# Patient Record
Sex: FEMALE | Race: WHITE | Employment: STUDENT | URBAN - METROPOLITAN AREA
[De-identification: names, ages, dates, MRNs, and addresses within clinical notes are randomized per-mention and may not be internally consistent; named-entity substitution may affect disease eponyms.]

---

## 2017-01-11 ENCOUNTER — OFFICE VISIT (OUTPATIENT)
Dept: FAMILY MEDICINE | Facility: CLINIC | Age: 22
End: 2017-01-11
Payer: COMMERCIAL

## 2017-01-11 VITALS
HEIGHT: 65 IN | OXYGEN SATURATION: 97 % | BODY MASS INDEX: 23.99 KG/M2 | DIASTOLIC BLOOD PRESSURE: 62 MMHG | SYSTOLIC BLOOD PRESSURE: 100 MMHG | RESPIRATION RATE: 14 BRPM | TEMPERATURE: 98.2 F | HEART RATE: 82 BPM | WEIGHT: 144 LBS

## 2017-01-11 DIAGNOSIS — R09.82 POST-NASAL DRAINAGE: Primary | ICD-10-CM

## 2017-01-11 DIAGNOSIS — E78.00 ELEVATED LDL CHOLESTEROL LEVEL: ICD-10-CM

## 2017-01-11 PROCEDURE — 99214 OFFICE O/P EST MOD 30 MIN: CPT | Performed by: INTERNAL MEDICINE

## 2017-01-11 PROCEDURE — 80053 COMPREHEN METABOLIC PANEL: CPT | Performed by: INTERNAL MEDICINE

## 2017-01-11 PROCEDURE — 36415 COLL VENOUS BLD VENIPUNCTURE: CPT | Performed by: INTERNAL MEDICINE

## 2017-01-11 PROCEDURE — 80061 LIPID PANEL: CPT | Performed by: INTERNAL MEDICINE

## 2017-01-11 RX ORDER — FLUTICASONE PROPIONATE 50 MCG
1-2 SPRAY, SUSPENSION (ML) NASAL DAILY
Qty: 1 BOTTLE | Refills: 1 | Status: SHIPPED | OUTPATIENT
Start: 2017-01-11 | End: 2018-10-09

## 2017-01-11 NOTE — MR AVS SNAPSHOT
After Visit Summary   1/11/2017    Charmaine Lopez    MRN: [id number]           Patient Information     Date Of Birth          1995        Visit Information        Provider Department      1/11/2017 10:00 AM Kailyn Flood MD [facility]        Today's Diagnoses     Post-nasal drainage    -  1     Elevated LDL cholesterol level           Care Instructions    Start using Flonase nasal spray: 1-2 sprays into each nostril per day for the next 4 weeks. Even if your symptoms appear to improve in a week or so, continue using the spray the entire 4 weeks to completely clear your symptoms. If your symptoms do not significantly improve in that time, please follow-up with the clinic.        Follow-ups after your visit        Who to contact     If you have questions or need follow up information about today's clinic visit or your schedule please contact [facility] directly at [phone].  Normal or non-critical lab and imaging results will be communicated to you by [facility] [employer], letter or phone within 4 business days after the clinic has received the results. If you do not hear from us within 7 days, please contact the clinic through [facility] or phone. If you have a critical or abnormal lab result, we will notify you by phone as soon as possible.  Submit refill requests through [employer] or call your pharmacy and they will forward the refill request to us. Please allow 3 business days for your refill to be completed.          Additional Information About Your Visit        MyChart Information     [employer] gives you secure access to your electronic health record. If you see a primary care provider, you can also send messages to your care team and make appointments. If you have questions, please call your primary care clinic.  If you do not have a primary care provider, please call [phone] and they will assist you.        Care EveryWhere ID     This is your Care EveryWhere ID.  "This could be used by other organizations to access your [place] medical records  [id number]        Your Vitals Were     Pulse Temperature Respirations    82 98.2  F (36.8  C) (Oral) 14    Height BMI (Body Mass Index) Pulse Oximetry    5' 5\" (1.651 m) 23.96 kg/m2 97%    Last Period          12/19/2016 (Approximate)         Blood Pressure from Last 3 Encounters:   01/11/17 100/62   12/29/16 123/69   07/14/16 108/60    Weight from Last 3 Encounters:   01/11/17 144 lb (65.318 kg)   12/28/16 140 lb (63.504 kg)   07/14/16 140 lb 4.8 oz (63.64 kg)              We Performed the Following     Comprehensive metabolic panel     Lipid Profile with reflex to direct LDL          Today's Medication Changes          These changes are accurate as of: 1/11/17 10:32 AM.  If you have any questions, ask your nurse or doctor.               Start taking these medicines.        Dose/Directions    fluticasone 50 MCG/ACT spray   Commonly known as:  FLONASE   Used for:  Post-nasal drainage   Started by:  Kailyn Flood MD        Dose:  1-2 spray   Spray 1-2 sprays into both nostrils daily   Quantity:  1 Bottle   Refills:  1            Where to get your medicines      These medications were sent to [facility] [address] [place] [facility] [place], [place] - [address]  [address] [facility] [address]     Phone:  [phone]    - fluticasone 50 MCG/ACT spray             Primary Care Provider Office Phone # Fax #    Kailyn Flood [phone]       [facility] [address] [facility] [address]        Thank you!     Thank you for choosing [facility]  for your care. Our goal is always to provide you with excellent care. Hearing back from our patients is one way we can continue to improve our services. Please take a few minutes to complete the written survey that you may receive in the mail after your visit with us. Thank you!             Your Updated Medication List - Protect " others around you: Learn how to safely use, store and throw away your medicines at www.disposemymeds.org.          This list is accurate as of: 1/11/17 10:32 AM.  Always use your most recent med list.                   Brand Name Dispense Instructions for use    fluticasone 50 MCG/ACT spray    FLONASE    1 Bottle    Spray 1-2 sprays into both nostrils daily       norethindrone-ethinyl estradiol 1-20 MG-MCG per tablet    GILDESS MAURICIO 1/20    84 tablet    Take 1 tablet by mouth daily       sulfacetamide sodium (Acne) 10 % Lotn      Apply topically daily

## 2017-01-11 NOTE — NURSING NOTE
"Chief Complaint   Patient presents with     URI     Lipids     states cholesterol has been elevated in the past and would like to have it checked.        Initial /62 mmHg  Pulse 82  Temp(Src) 98.2  F (36.8  C) (Oral)  Resp 14  Ht 5' 5\" (1.651 m)  Wt 144 lb (65.318 kg)  BMI 23.96 kg/m2  SpO2 97%  LMP 12/19/2016 (Approximate) Estimated body mass index is 23.96 kg/(m^2) as calculated from the following:    Height as of this encounter: 5' 5\" (1.651 m).    Weight as of this encounter: 144 lb (65.318 kg).  BP completed using cuff size: Large    "

## 2017-01-11 NOTE — PROGRESS NOTES
SUBJECTIVE:                                                    Charmaine Lopez is a 21 year old female who presents to clinic today for the following health issues:      Acute Illness   Acute illness concerns: cough  (seen about 4 weeks ago and diagnosed with a virus)    Onset: about 4 weeks       Fever: YES- when she first developed symptoms     Chills/Sweats: YES- a month ago    Headache (location?): no    Sinus Pressure:no    Conjunctivitis:  no    Ear Pain: no    Rhinorrhea: no    Congestion: no    Sore Throat: YES     Cough: YES-productive of green sputum    Wheeze: no    Decreased Appetite: no    Nausea: no    Vomiting: no    Diarrhea:  no    Dysuria/Freq.: no    Fatigue/Achiness: no    Sick/Strep Exposure: no    Therapies Tried and outcome: ibuprofen      Sore Throat:  About a month ago, the patient began experiencing symptoms of a strep throat including a sore throat, voice hoarseness, fever and chills, and a cough. Her symptoms were at their worst initially, but progressively improved with rest and the use of Ibuprofen as needed for discomfort. Although her symptoms have improved, they have not completely subsided, which is why she would like to be evaluated today. The patient denies allergies or exposure to illness, but does complain of post-nasal drainage, a sore throat, and a cough, all of which have persisted since her symptoms began. Of note, she was seen during the onset of her symptoms, but was told her symptoms were the result of a viral infection, which would need to be cleared with time.    Cholesterol:  The patient had a lipid panel completed in 2014 which resulted abnormally, with a total cholesterol reading of 239 and a LDL cholesterol of 150. With her history of elevated cholesterol, she would like additional testing today to recheck her levels. She states her levels should not be that high because she eats healthy and exercises regularly.        Problem list and histories reviewed & adjusted,  "as indicated.  Additional history: as documented    Labs reviewed in EP[facility]  Problem list, Medication list, Allergies, and Medical/Social/Surgical histories reviewed in [facility] and updated as appropriate.      REVIEW OF SYSTEMS:  C: NEGATIVE for fever, chills, or change in weight  E: NEGATIVE for vision changes or irritation  E/M: POSITIVE for a sore throat; NEGATIVE for ear or mouth problems  R: POSITIVE for a productive cough; NEGATIVE for significant cough or SOB  CV: NEGATIVE for chest pain, palpitations or peripheral edema  GI: NEGATIVE for nausea, abdominal pain, heartburn, or change in bowel habits  M: NEGATIVE for significant arthralgias or myalgia  N: NEGATIVE for weakness, dizziness or paresthesias  E: Hx or elevated total and LDL cholesterol; Fasting for labs today; NEGATIVE for temperature intolerance, or skin/hair changes  P: NEGATIVE for changes in mood or affect    This document serves as a record of the services and decisions personally performed and made by Kailyn Flood MD. It was created on her behalf by Jaclyn Smith, a trained medical scribe. The creation of this document is based the provider's statements to the medical scribe.  Jaclyn Smith, January 11, 2017 10:07 AM     OBJECTIVE:                                                    /62 mmHg  Pulse 82  Temp(Src) 98.2  F (36.8  C) (Oral)  Resp 14  Ht 1.651 m (5' 5\")  Wt 65.318 kg (144 lb)  BMI 23.96 kg/m2  SpO2 97%  LMP 12/19/2016 (Approximate)  Body mass index is 23.96 kg/(m^2).    EXAM:  GENERAL: Patient appears healthy, alert and not distressed.  EYES: Eyes appear grossly normal to inspection, with normal conjunctivae and sclerae  HENT: Nasal congestion with evidence of post-nasal drip noted; Ear canals and TM's appear normal, nose and mouth are without ulcers or lesions, oropharynx is clear and oral mucous membranes are moist  NECK: No adenopathy present, no asymmetry, masses, or scars noted, thyroid is normal to palpation  RESP: " Lungs are clear to auscultation - no rales, rhonchi or wheezes present  CV: Regular rate and rhythm, normal S1 S2 heart sounds, no ectopy, no peripheral edema present, peripheral pulses normal, no carotid bruit.  ABDOMEN: Soft, nontender, no hepatosplenomegaly, no masses, normal bowel sounds  MS: No gross musculoskeletal defects noted, no edema, gait is age appropriate without ataxia  NEURO: Patient exhibits normal strength and tone, normal speech and mentation  PSYCH: Mentation appears normal, affect is normal/bright    Diagnostic Test Results:  No results found for this or any previous visit (from the past 24 hour(s)).      ASSESSMENT/PLAN:                                                    (R09.82) Post-nasal drainage  (primary encounter diagnosis)  Comment: Symptomatic for one month; Initially caused by viral infection, but has persisted since; Start Flonase nasal spray: 1-2 sprays into each nostril daily for four weeks for symptom clearance; If symptoms do not significantly improve in that time, follow-up with the clinic.  Plan: fluticasone (FLONASE) 50 MCG/ACT spray          (E78.00) Elevated LDL cholesterol level  Comment: Historically elevated Total and LDL cholesterol readings; 7/30/2014 lipid panel revealed Total Chol of 239 and LDL Chol of 150; Requested fasting labs to recheck cholesterol levels; Patient is fasting today and will have labs drawn today; she is in college; diet and exercise reviewed; do not anticiapte prescription meds. Pooled cohort risk assessment is not validated in this age group  Reviewed risk assessment  Plan: Lipid Profile with reflex to direct LDL,         Comprehensive metabolic panel            The information in this document, created by a medical scribe for me, accurately reflects the services I personally performed and the decisions made by me. I have reviewed and approved this document for accuracy.  Dr. Kailyn Flood, 10:32 AM, January 11, 2017    Kailyn Flood,  MD  Internal Medicine   [facility]

## 2017-01-11 NOTE — PATIENT INSTRUCTIONS
Start using Flonase nasal spray: 1-2 sprays into each nostril per day for the next 4 weeks. Even if your symptoms appear to improve in a week or so, continue using the spray the entire 4 weeks to completely clear your symptoms. If your symptoms do not significantly improve in that time, please follow-up with the clinic.

## 2017-01-12 LAB
ALBUMIN SERPL-MCNC: 3.6 G/DL (ref 3.4–5)
ALP SERPL-CCNC: 52 U/L (ref 40–150)
ALT SERPL W P-5'-P-CCNC: 18 U/L (ref 0–50)
ANION GAP SERPL CALCULATED.3IONS-SCNC: 6 MMOL/L (ref 3–14)
AST SERPL W P-5'-P-CCNC: 12 U/L (ref 0–45)
BILIRUB SERPL-MCNC: 0.2 MG/DL (ref 0.2–1.3)
BUN SERPL-MCNC: 7 MG/DL (ref 7–30)
CALCIUM SERPL-MCNC: 9 MG/DL (ref 8.5–10.1)
CHLORIDE SERPL-SCNC: 108 MMOL/L (ref 94–109)
CHOLEST SERPL-MCNC: 226 MG/DL
CO2 SERPL-SCNC: 25 MMOL/L (ref 20–32)
CREAT SERPL-MCNC: 0.74 MG/DL (ref 0.52–1.04)
GFR SERPL CREATININE-BSD FRML MDRD: NORMAL ML/MIN/1.7M2
GLUCOSE SERPL-MCNC: 81 MG/DL (ref 70–99)
HDLC SERPL-MCNC: 56 MG/DL
LDLC SERPL CALC-MCNC: 150 MG/DL
NONHDLC SERPL-MCNC: 170 MG/DL
POTASSIUM SERPL-SCNC: 4.2 MMOL/L (ref 3.4–5.3)
PROT SERPL-MCNC: 7.5 G/DL (ref 6.8–8.8)
SODIUM SERPL-SCNC: 139 MMOL/L (ref 133–144)
TRIGL SERPL-MCNC: 98 MG/DL

## 2017-01-13 ENCOUNTER — MYC MEDICAL ADVICE (OUTPATIENT)
Dept: FAMILY MEDICINE | Facility: CLINIC | Age: 22
End: 2017-01-13

## 2017-01-16 NOTE — PROGRESS NOTES
Quick Note:    LDL still running A bit on the high side; maximize diet and exercise; no lipid medications are recommended at this time.  Pt advised via My Chart.    ______

## 2017-03-22 ENCOUNTER — OFFICE VISIT (OUTPATIENT)
Dept: FAMILY MEDICINE | Facility: CLINIC | Age: 22
End: 2017-03-22
Payer: COMMERCIAL

## 2017-03-22 VITALS
TEMPERATURE: 97.6 F | BODY MASS INDEX: 24.66 KG/M2 | RESPIRATION RATE: 18 BRPM | WEIGHT: 148 LBS | DIASTOLIC BLOOD PRESSURE: 56 MMHG | HEART RATE: 99 BPM | OXYGEN SATURATION: 100 % | SYSTOLIC BLOOD PRESSURE: 118 MMHG | HEIGHT: 65 IN

## 2017-03-22 DIAGNOSIS — Z23 NEED FOR RABIES VACCINATION: Primary | ICD-10-CM

## 2017-03-22 PROCEDURE — 96372 THER/PROPH/DIAG INJ SC/IM: CPT | Performed by: NURSE PRACTITIONER

## 2017-03-22 PROCEDURE — 99213 OFFICE O/P EST LOW 20 MIN: CPT | Mod: 25 | Performed by: NURSE PRACTITIONER

## 2017-03-22 PROCEDURE — 90675 RABIES VACCINE IM: CPT | Performed by: NURSE PRACTITIONER

## 2017-03-22 NOTE — PROGRESS NOTES
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  She has never had the rabies series before.  She has a normally functioning immune system, normal kidney and liver function.  Currently well.  Tolerated immunizations well in the past.    She is at school in [place].  Would like to start series here and finish in [place].    Problem list and histories reviewed & adjusted, as indicated.  Additional history: as documented    Patient Active Problem List   Diagnosis     No active medical problems     Oral contraceptive pill surveillance     Past Surgical History:   Procedure Laterality Date     HC TOOTH EXTRACTION W/FORCEP  2011       Social History   Substance Use Topics     Smoking status: Never Smoker     Smokeless tobacco: Never Used     Alcohol use No     Family History   Problem Relation Age of Onset     Thyroid Disease Mother      GASTROINTESTINAL DISEASE Mother      ulcerative colitis     Lipids Father      Hyperlipidemia Father      Asthma Brother            Reviewed and updated as needed this visit by clinical staff       Reviewed and updated as needed this visit by Provider         ROS:  SEE HPI.    OBJECTIVE:                                                    /56 (BP Location: Right arm, Patient Position: Chair, Cuff Size: Adult Regular)  Pulse 99  Temp 97.6  F (36.4  C) (Tympanic)  Resp 18  Ht 5' 5\" (1.651 m)  Wt 148 lb (67.1 kg)  LMP 03/21/2017 (Exact Date)  SpO2 100%  Breastfeeding? No  BMI 24.63 kg/m2  Body mass index is 24.63 kg/(m^2).  GENERAL: healthy, alert and no distress  PSYCH: mentation appears normal, affect normal/bright    Diagnostic Test Results:  none  "     ASSESSMENT/PLAN:                                                    1. Need for rabies vaccination  21 y.o. 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  Start series now.  Reviewed timing of follow up injections needed.  She feels comfortable locating a clinic in [place] that can provide the remaining 2 doses she needs.  Pt agrees with plan and verbalized understanding.  - RABIES VACCINE, IM  - INJECTION INTRAMUSCULAR OR SUB-Q    BREE Cardenas Ra, CNP  [facility]

## 2017-03-22 NOTE — PATIENT INSTRUCTIONS
Return to clinic in 1 week and then 2-3 weeks after that for the repeat injections.    The schedule is:  1 dose today.  1 dose in 1 week and then final dose on weeks 3 or 4.    In the future you will have titers done to check not immunity status.

## 2017-03-22 NOTE — NURSING NOTE
"Chief Complaint   Patient presents with     Consult     Immunizations       Initial /56 (BP Location: Right arm, Patient Position: Chair, Cuff Size: Adult Regular)  Pulse 99  Temp 97.6  F (36.4  C) (Tympanic)  Resp 18  Ht 5' 5\" (1.651 m)  Wt 148 lb (67.1 kg)  LMP 03/21/2017 (Exact Date)  SpO2 100%  Breastfeeding? No  BMI 24.63 kg/m2 Estimated body mass index is 24.63 kg/(m^2) as calculated from the following:    Height as of this encounter: 5' 5\" (1.651 m).    Weight as of this encounter: 148 lb (67.1 kg).  Medication Reconciliation: complete     Patient would like to be notified at the following phone number for results from this visit   [phone] OK to leave message or Marysol Rutherford CMA (AAMA) 3/22/2017 11:06 AM      "

## 2017-03-22 NOTE — MR AVS SNAPSHOT
After Visit Summary   3/22/2017    Charmaine Lopez    MRN: [id number]           Patient Information     Date Of Birth          1995        Visit Information        Provider Department      3/22/2017 11:00 AM Maria G Mcfarland Ra, APRN CNP [facility]        Care Instructions    Return to clinic in 1 week and then 2-3 weeks after that for the repeat injections.    The schedule is:  1 dose today.  1 dose in 1 week and then final dose on weeks 3 or 4.    In the future you will have titers done to check not immunity status.        Follow-ups after your visit        Who to contact     If you have questions or need follow up information about today's clinic visit or your schedule please contact [facility] directly at [phone].  Normal or non-critical lab and imaging results will be communicated to you by MyChart, letter or phone within 4 business days after the clinic has received the results. If you do not hear from us within 7 days, please contact the clinic through [employer] or phone. If you have a critical or abnormal lab result, we will notify you by phone as soon as possible.  Submit refill requests through [email or web address] or call your pharmacy and they will forward the refill request to us. Please allow 3 business days for your refill to be completed.          Additional Information About Your Visit        MyChart Information     [email or web address] gives you secure access to your electronic health record. If you see a primary care provider, you can also send messages to your care team and make appointments. If you have questions, please call your primary care clinic.  If you do not have a primary care provider, please call [phone] and they will assist you.        Care EveryWhere ID     This is your Care EveryWhere ID. This could be used by other organizations to access your [place] medical records  [id number]        Your Vitals Were     Pulse Temperature Respirations Height Last  "Period Pulse Oximetry    99 97.6  F (36.4  C) (Tympanic) 18 5' 5\" (1.651 m) 03/21/2017 (Exact Date) 100%    Breastfeeding? BMI (Body Mass Index)                No 24.63 kg/m2           Blood Pressure from Last 3 Encounters:   03/22/17 118/56   01/11/17 100/62   12/29/16 123/69    Weight from Last 3 Encounters:   03/22/17 148 lb (67.1 kg)   01/11/17 144 lb (65.3 kg)   12/28/16 140 lb (63.5 kg)              Today, you had the following     No orders found for display       Primary Care Provider Office Phone # Fax #    Kailyn Flood [phone]       [facility] [address] [facility] [address]        Thank you!     Thank you for choosing [facility]  for your care. Our goal is always to provide you with excellent care. Hearing back from our patients is one way we can continue to improve our services. Please take a few minutes to complete the written survey that you may receive in the mail after your visit with us. Thank you!             Your Updated Medication List - Protect others around you: Learn how to safely use, store and throw away your medicines at www.disposemymeds.org.          This list is accurate as of: 3/22/17 11:25 AM.  Always use your most recent med list.                   Brand Name Dispense Instructions for use    fluticasone 50 MCG/ACT spray    FLONASE    1 Bottle    Spray 1-2 sprays into both nostrils daily       norethindrone-ethinyl estradiol 1-20 MG-MCG per tablet    GILDESS FE 1/20    84 tablet    Take 1 tablet by mouth daily       sulfacetamide sodium (Acne) 10 % lotion      Apply topically daily         "

## 2017-04-03 ENCOUNTER — TELEPHONE (OUTPATIENT)
Dept: FAMILY MEDICINE | Facility: CLINIC | Age: 22
End: 2017-04-03

## 2017-04-03 NOTE — TELEPHONE ENCOUNTER
Patient is calling to have the order for her Rabies vaccines faxed to [place].  They need a copy of it to schedule an appointment for her next shot.  She is going to school there. Had one injection here at the clinic, will be having the  Remaining injections there. The order was printed and faxed to [phone] the  [facility] in [place].   Marjan Myles RN  Triage Nurse

## 2017-04-05 NOTE — TELEPHONE ENCOUNTER
Hospital in [place] does not like the way the order was written, so they have sent another request to have it rewritten and sent.   Per deysi, call patient and ask if she would like to just start the series over when she is back in town this summer, or if she would  Like to see a provider in [place] to have them order it for her. She would have to start the series over at this point due to the   Delay in ordering. Have sent the visit note and a written letter, these were not sufficient.   Called patient and left message to ask if she would like to start over when she is here this summer. Just needs to let us know a few days ahead  Of time so we can have the vaccines on hand, or if she wants to have a provider in [place] area order it for her. (this would be another visit)    Marjan Myles, NICK  Triage Nurse

## 2017-05-15 ENCOUNTER — TELEPHONE (OUTPATIENT)
Dept: FAMILY MEDICINE | Facility: CLINIC | Age: 22
End: 2017-05-15

## 2017-05-15 NOTE — TELEPHONE ENCOUNTER
Pt was unable to finish rabies series in [place], so will be starting it over and doing all three here.  Three doses left in fridge, pts name placed on them and she made appts 5/19 and 5/26, she will make last appt after 2nd injection as does not know her work sched that far out.

## 2017-05-19 ENCOUNTER — ALLIED HEALTH/NURSE VISIT (OUTPATIENT)
Dept: NURSING | Facility: CLINIC | Age: 22
End: 2017-05-19
Payer: COMMERCIAL

## 2017-05-19 DIAGNOSIS — Z23 NEED FOR VACCINATION: Primary | ICD-10-CM

## 2017-05-19 PROCEDURE — 99207 ZZC NO CHARGE NURSE ONLY: CPT

## 2017-05-19 PROCEDURE — 90471 IMMUNIZATION ADMIN: CPT

## 2017-05-19 PROCEDURE — 90675 RABIES VACCINE IM: CPT

## 2017-05-19 NOTE — MR AVS SNAPSHOT
After Visit Summary   5/19/2017    Charmaine Lopez    MRN: [id number]           Patient Information     Date Of Birth          1995        Visit Information        Provider Department      5/19/2017 1:30 PM  NURSE [facility]        Today's Diagnoses     Need for vaccination    -  1       Follow-ups after your visit        Your next 10 appointments already scheduled     May 26, 2017  9:30 AM CDT   Nurse Only with  NURSE   [facility]    [address] [facility] [address] [phone]              Who to contact     If you have questions or need follow up information about today's clinic visit or your schedule please contact [facility] directly at [phone].  Normal or non-critical lab and imaging results will be communicated to you by scroll kithart, letter or phone within 4 business days after the clinic has received the results. If you do not hear from us within 7 days, please contact the clinic through scroll kithart or phone. If you have a critical or abnormal lab result, we will notify you by phone as soon as possible.  Submit refill requests through [employer] or call your pharmacy and they will forward the refill request to us. Please allow 3 business days for your refill to be completed.          Additional Information About Your Visit        MyChart Information     [employer] gives you secure access to your electronic health record. If you see a primary care provider, you can also send messages to your care team and make appointments. If you have questions, please call your primary care clinic.  If you do not have a primary care provider, please call [phone] and they will assist you.        Care EveryWhere ID     This is your Care EveryWhere ID. This could be used by other organizations to access your [place] medical records  [id number]         Blood Pressure from Last 3 Encounters:   03/22/17 118/56    01/11/17 100/62   12/29/16 123/69    Weight from Last 3 Encounters:   03/22/17 148 lb (67.1 kg)   01/11/17 144 lb (65.3 kg)   12/28/16 140 lb (63.5 kg)              We Performed the Following     ADMIN 1st VACCINE     RABIES VACCINE, IM     SCREENING QUESTIONS FOR ADULT IMMUNIZATIONS        Primary Care Provider Office Phone # Fax #    Kailyn Flood [phone]       [facility] [address]  [facility] [address]        Thank you!     Thank you for choosing [facility]  for your care. Our goal is always to provide you with excellent care. Hearing back from our patients is one way we can continue to improve our services. Please take a few minutes to complete the written survey that you may receive in the mail after your visit with us. Thank you!             Your Updated Medication List - Protect others around you: Learn how to safely use, store and throw away your medicines at www.disposemymeds.org.          This list is accurate as of: 5/19/17  1:48 PM.  Always use your most recent med list.                   Brand Name Dispense Instructions for use    fluticasone 50 MCG/ACT spray    FLONASE    1 Bottle    Spray 1-2 sprays into both nostrils daily       norethindrone-ethinyl estradiol 1-20 MG-MCG per tablet    GILDESS FE 1/20    84 tablet    Take 1 tablet by mouth daily       sulfacetamide sodium (Acne) 10 % lotion      Apply topically daily

## 2017-05-25 ENCOUNTER — ALLIED HEALTH/NURSE VISIT (OUTPATIENT)
Dept: NURSING | Facility: CLINIC | Age: 22
End: 2017-05-25
Payer: COMMERCIAL

## 2017-05-25 DIAGNOSIS — Z23 NEED FOR RABIES VACCINATION: Primary | ICD-10-CM

## 2017-05-25 PROCEDURE — 90471 IMMUNIZATION ADMIN: CPT

## 2017-05-25 PROCEDURE — 99207 ZZC NO CHARGE NURSE ONLY: CPT

## 2017-05-25 PROCEDURE — 90675 RABIES VACCINE IM: CPT

## 2017-05-25 NOTE — NURSING NOTE
Screening Questionnaire for Adult Immunization    Are you sick today?   No   Do you have allergies to medications, food, a vaccine component or latex?   No   Have you ever had a serious reaction after receiving a vaccination?   No   Do you have a long-term health problem with heart disease, lung disease, asthma, kidney disease, metabolic disease (e.g. diabetes), anemia, or other blood disorder?   No   Do you have cancer, leukemia, HIV/AIDS, or any other immune system problem?   No   In the past 3 months, have you taken medications that affect  your immune system, such as prednisone, other steroids, or anticancer drugs; drugs for the treatment of rheumatoid arthritis, Crohn s disease, or psoriasis; or have you had radiation treatments?   No   Have you had a seizure, or a brain or other nervous system problem?   No   During the past year, have you received a transfusion of blood or blood     products, or been given immune (gamma) globulin or antiviral drug?   No   For women: Are you pregnant or is there a chance you could become        pregnant during the next month?   No   Have you received any vaccinations in the past 4 weeks?   No     Immunization questionnaire answers were all negative.      MNVFC doesn't apply on this patient    Per orders of Maria G Mcfarland, injection of Rabies vaccine given by Anna Regan. Patient instructed to remain in clinic for 20 minutes afterwards, and to report any adverse reaction to me immediately.       Screening performed by Anna Regan on 5/25/2017 at 9:08 AM.

## 2017-05-25 NOTE — MR AVS SNAPSHOT
After Visit Summary   5/25/2017    Charmaine Lopez    MRN: [id number]           Patient Information     Date Of Birth          1995        Visit Information        Provider Department      5/25/2017 8:30 AM  NURSE [place] Yodit Crow        Today's Diagnoses     Need for rabies vaccination    -  1       Follow-ups after your visit        Who to contact     If you have questions or need follow up information about today's clinic visit or your schedule please contact [facility] directly at [phone].  Normal or non-critical lab and imaging results will be communicated to you by Jintronixhart, letter or phone within 4 business days after the clinic has received the results. If you do not hear from us within 7 days, please contact the clinic through Jintronixhart or phone. If you have a critical or abnormal lab result, we will notify you by phone as soon as possible.  Submit refill requests through Lumus or call your pharmacy and they will forward the refill request to us. Please allow 3 business days for your refill to be completed.          Additional Information About Your Visit        MyChart Information     Lumus gives you secure access to your electronic health record. If you see a primary care provider, you can also send messages to your care team and make appointments. If you have questions, please call your primary care clinic.  If you do not have a primary care provider, please call [phone] and they will assist you.        Care EveryWhere ID     This is your Care EveryWhere ID. This could be used by other organizations to access your [place] medical records  [id number]         Blood Pressure from Last 3 Encounters:   03/22/17 118/56   01/11/17 100/62   12/29/16 123/69    Weight from Last 3 Encounters:   03/22/17 148 lb (67.1 kg)   01/11/17 144 lb (65.3 kg)   12/28/16 140 lb (63.5 kg)              We Performed the Following     RABIES VACCINE, IM        Primary Care  Provider Office Phone # Fax #    Kailyn Flood [phone]       [facility] [address]  Counts include 234 beds at the [facility] [address]        Thank you!     Thank you for choosing [facility]  for your care. Our goal is always to provide you with excellent care. Hearing back from our patients is one way we can continue to improve our services. Please take a few minutes to complete the written survey that you may receive in the mail after your visit with us. Thank you!             Your Updated Medication List - Protect others around you: Learn how to safely use, store and throw away your medicines at www.disposemymeds.org.          This list is accurate as of: 5/25/17  9:13 AM.  Always use your most recent med list.                   Brand Name Dispense Instructions for use    fluticasone 50 MCG/ACT spray    FLONASE    1 Bottle    Spray 1-2 sprays into both nostrils daily       norethindrone-ethinyl estradiol 1-20 MG-MCG per tablet    GILDESS FE 1/20    84 tablet    Take 1 tablet by mouth daily       sulfacetamide sodium (Acne) 10 % lotion      Apply topically daily

## 2017-06-15 ENCOUNTER — ALLIED HEALTH/NURSE VISIT (OUTPATIENT)
Dept: NURSING | Facility: CLINIC | Age: 22
End: 2017-06-15
Payer: COMMERCIAL

## 2017-06-15 DIAGNOSIS — Z23 ENCOUNTER FOR IMMUNIZATION: Primary | ICD-10-CM

## 2017-06-15 DIAGNOSIS — Z92.29 HISTORY OF RABIES VACCINATION: Primary | ICD-10-CM

## 2017-06-15 PROCEDURE — 90675 RABIES VACCINE IM: CPT

## 2017-06-15 PROCEDURE — 90471 IMMUNIZATION ADMIN: CPT

## 2017-06-15 PROCEDURE — 99207 ZZC NO CHARGE NURSE ONLY: CPT

## 2017-06-15 NOTE — MR AVS SNAPSHOT
After Visit Summary   6/15/2017    Charmaine Lopez    MRN: [id number]           Patient Information     Date Of Birth          1995        Visit Information        Provider Department      6/15/2017 10:00 AM  NURSE [place] Yodit Crow        Today's Diagnoses     Encounter for immunization    -  1       Follow-ups after your visit        Who to contact     If you have questions or need follow up information about today's clinic visit or your schedule please contact [facility] directly at [phone].  Normal or non-critical lab and imaging results will be communicated to you by MyChart, letter or phone within 4 business days after the clinic has received the results. If you do not hear from us within 7 days, please contact the clinic through CitySourcedhart or phone. If you have a critical or abnormal lab result, we will notify you by phone as soon as possible.  Submit refill requests through [employer] or call your pharmacy and they will forward the refill request to us. Please allow 3 business days for your refill to be completed.          Additional Information About Your Visit        MyChart Information     [employer] gives you secure access to your electronic health record. If you see a primary care provider, you can also send messages to your care team and make appointments. If you have questions, please call your primary care clinic.  If you do not have a primary care provider, please call [phone] and they will assist you.        Care EveryWhere ID     This is your Care EveryWhere ID. This could be used by other organizations to access your [place] medical records  [id number]         Blood Pressure from Last 3 Encounters:   03/22/17 118/56   01/11/17 100/62   12/29/16 123/69    Weight from Last 3 Encounters:   03/22/17 148 lb (67.1 kg)   01/11/17 144 lb (65.3 kg)   12/28/16 140 lb (63.5 kg)              We Performed the Following     RABIES VACCINE, IM        Primary Care  Provider Office Phone # Fax #    Kailyn Flood [phone]       [facility] [address] [address]  [facility] [address]        Thank you!     Thank you for choosing [facility]  for your care. Our goal is always to provide you with excellent care. Hearing back from our patients is one way we can continue to improve our services. Please take a few minutes to complete the written survey that you may receive in the mail after your visit with us. Thank you!             Your Updated Medication List - Protect others around you: Learn how to safely use, store and throw away your medicines at www.disposemymeds.org.          This list is accurate as of: 6/15/17 10:18 AM.  Always use your most recent med list.                   Brand Name Dispense Instructions for use    fluticasone 50 MCG/ACT spray    FLONASE    1 Bottle    Spray 1-2 sprays into both nostrils daily       norethindrone-ethinyl estradiol 1-20 MG-MCG per tablet    GILDESS FE 1/20    84 tablet    Take 1 tablet by mouth daily       sulfacetamide sodium (Acne) 10 % lotion      Apply topically daily

## 2017-06-15 NOTE — NURSING NOTE
Pt is requesting to have a titer drawn for rabies.  She is going to check with her employer  To see if it is required.    Dr. Flood said it was ok to put in a Titer to be drawn but it is not something that she recommends and so we are ordering it by request of the pt and do not believe it will be covered by insurance.

## 2017-06-27 DIAGNOSIS — Z92.29 HISTORY OF RABIES VACCINATION: ICD-10-CM

## 2017-06-27 PROCEDURE — 36415 COLL VENOUS BLD VENIPUNCTURE: CPT | Performed by: INTERNAL MEDICINE

## 2017-06-27 PROCEDURE — 86790 VIRUS ANTIBODY NOS: CPT | Mod: 90 | Performed by: INTERNAL MEDICINE

## 2017-06-27 PROCEDURE — 99000 SPECIMEN HANDLING OFFICE-LAB: CPT | Performed by: INTERNAL MEDICINE

## 2017-07-23 ENCOUNTER — TELEPHONE (OUTPATIENT)
Dept: FAMILY MEDICINE | Facility: CLINIC | Age: 22
End: 2017-07-23

## 2017-07-23 DIAGNOSIS — Z00.00 ROUTINE GENERAL MEDICAL EXAMINATION AT A HEALTH CARE FACILITY: ICD-10-CM

## 2017-07-23 DIAGNOSIS — Z30.41 ORAL CONTRACEPTIVE PILL SURVEILLANCE: ICD-10-CM

## 2017-07-24 RX ORDER — NORETHINDRONE ACETATE AND ETHINYL ESTRADIOL 1MG-20(21)
KIT ORAL
Qty: 28 TABLET | Refills: 0 | Status: SHIPPED | OUTPATIENT
Start: 2017-07-24

## 2017-07-24 NOTE — TELEPHONE ENCOUNTER
Medication is being filled for 1 time refill only due to:  Patient needs to be seen because due for annual visit.     Routing to  [facility] to facilitate office visit appointment.

## 2017-07-24 NOTE — TELEPHONE ENCOUNTER
Junaid MARTÍNEZ      Last Written Prescription Date: 11/30/16  Last Fill Quantity: 84,  # refills: 2   Last Office Visit with [facility], [facility] or  Health prescribing provider: 7/14/16 for wellness visit

## 2017-07-31 LAB — RABV IGG SER-ACNC: NORMAL

## 2017-08-02 ENCOUNTER — MYC MEDICAL ADVICE (OUTPATIENT)
Dept: FAMILY MEDICINE | Facility: CLINIC | Age: 22
End: 2017-08-02

## 2017-08-02 NOTE — TELEPHONE ENCOUNTER
Print out received from our lab. Waiting for response if patient would like it mailed to her or she wanted to come and pick it up.    Marjan Myles, RN  Triage Nurse

## 2017-08-19 ENCOUNTER — HEALTH MAINTENANCE LETTER (OUTPATIENT)
Age: 22
End: 2017-08-19

## 2017-08-28 ENCOUNTER — OFFICE VISIT (OUTPATIENT)
Dept: FAMILY MEDICINE | Facility: CLINIC | Age: 22
End: 2017-08-28
Payer: COMMERCIAL

## 2017-08-28 VITALS
SYSTOLIC BLOOD PRESSURE: 108 MMHG | TEMPERATURE: 98.2 F | RESPIRATION RATE: 16 BRPM | WEIGHT: 144.2 LBS | DIASTOLIC BLOOD PRESSURE: 60 MMHG | OXYGEN SATURATION: 99 % | HEART RATE: 88 BPM | BODY MASS INDEX: 24 KG/M2

## 2017-08-28 DIAGNOSIS — Z23 NEED FOR TDAP VACCINATION: ICD-10-CM

## 2017-08-28 DIAGNOSIS — E78.5 ELEVATED FASTING LIPID PROFILE: Primary | ICD-10-CM

## 2017-08-28 DIAGNOSIS — Z11.51 SCREENING FOR HUMAN PAPILLOMAVIRUS: ICD-10-CM

## 2017-08-28 LAB
CHOLEST SERPL-MCNC: 202 MG/DL
HDLC SERPL-MCNC: 71 MG/DL
LDLC SERPL CALC-MCNC: 120 MG/DL
NONHDLC SERPL-MCNC: 131 MG/DL
TRIGL SERPL-MCNC: 53 MG/DL

## 2017-08-28 PROCEDURE — 36415 COLL VENOUS BLD VENIPUNCTURE: CPT | Performed by: INTERNAL MEDICINE

## 2017-08-28 PROCEDURE — 90471 IMMUNIZATION ADMIN: CPT | Performed by: INTERNAL MEDICINE

## 2017-08-28 PROCEDURE — 80061 LIPID PANEL: CPT | Performed by: INTERNAL MEDICINE

## 2017-08-28 PROCEDURE — 99214 OFFICE O/P EST MOD 30 MIN: CPT | Mod: 25 | Performed by: INTERNAL MEDICINE

## 2017-08-28 PROCEDURE — 90715 TDAP VACCINE 7 YRS/> IM: CPT | Performed by: INTERNAL MEDICINE

## 2017-08-28 NOTE — MR AVS SNAPSHOT
"              After Visit Summary   8/28/2017    Charmaine Lopez    MRN: [id number]           Patient Information     Date Of Birth          1995        Visit Information        Provider Department      8/28/2017 9:30 AM Kailyn Flood MD [facility]        Today's Diagnoses     Elevated fasting lipid profile    -  1    Need for Tdap vaccination        Screening for human papillomavirus          Care Instructions    Check with [facility] whether the HPV vaccine was administered at their clinics. Then you can schedule a \"Nurse-only\" visit for updating this vaccination.           Follow-ups after your visit        Who to contact     If you have questions or need follow up information about today's clinic visit or your schedule please contact [facility] directly at [phone].  Normal or non-critical lab and imaging results will be communicated to you by Recipharmhart, letter or phone within 4 business days after the clinic has received the results. If you do not hear from us within 7 days, please contact the clinic through Recipharmhart or phone. If you have a critical or abnormal lab result, we will notify you by phone as soon as possible.  Submit refill requests through [employer] or call your pharmacy and they will forward the refill request to us. Please allow 3 business days for your refill to be completed.          Additional Information About Your Visit        MyChart Information     [employer] gives you secure access to your electronic health record. If you see a primary care provider, you can also send messages to your care team and make appointments. If you have questions, please call your primary care clinic.  If you do not have a primary care provider, please call [phone] and they will assist you.        Care EveryWhere ID     This is your Care EveryWhere ID. This could be used by other organizations to access your [place] medical records  [id number]        Your Vitals Were  "    Pulse Temperature Respirations Last Period Pulse Oximetry BMI (Body Mass Index)    88 98.2  F (36.8  C) (Oral) 16 08/08/2017 (Approximate) 99% 24 kg/m2       Blood Pressure from Last 3 Encounters:   08/28/17 108/60   03/22/17 118/56   01/11/17 100/62    Weight from Last 3 Encounters:   08/28/17 144 lb 3.2 oz (65.4 kg)   03/22/17 148 lb (67.1 kg)   01/11/17 144 lb (65.3 kg)              We Performed the Following     IMMUNIZATION ADMIN, FIRST     Lipid panel reflex to direct LDL     TDAP VACCINE (ADACEL)        Primary Care Provider Office Phone # Fax #    Kailyn Flood [phone] [address]  [facility] [address]        Equal Access to Services     [facility] : Hadii shlomo hutchinson hadasho Sojennifer, waaxda luqadaha, qaybta kaalmada adeegyada, stevie henley . So [facility] [phone].    ATENCIÓN: Si habla español, tiene a guerrero disposición servicios gratuitos de asistencia lingüística. Stan al [phone].    We comply with applicable federal civil rights laws and [place] laws. We do not discriminate on the basis of race, color, national origin, age, disability sex, sexual orientation or gender identity.            Thank you!     Thank you for choosing [facility]  for your care. Our goal is always to provide you with excellent care. Hearing back from our patients is one way we can continue to improve our services. Please take a few minutes to complete the written survey that you may receive in the mail after your visit with us. Thank you!             Your Updated Medication List - Protect others around you: Learn how to safely use, store and throw away your medicines at www.disposemymeds.org.          This list is accurate as of: 8/28/17 11:59 PM.  Always use your most recent med list.                   Brand Name Dispense Instructions for use Diagnosis    BLISOVI FE 1/20 1-20 MG-MCG per tablet   Generic drug:  norethindrone-ethinyl estradiol      28 tablet    TAKE 1 TABLET DAILY    Routine general medical examination at a health care facility, Oral contraceptive pill surveillance       fluticasone 50 MCG/ACT spray    FLONASE    1 Bottle    Spray 1-2 sprays into both nostrils daily    Post-nasal drainage       sulfacetamide sodium (Acne) 10 % lotion      Apply topically daily

## 2017-08-28 NOTE — PROGRESS NOTES
SUBJECTIVE:   Charmaine Lopez is a 22 year old female who presents to clinic today for the following health issues:    Updating immunizations and fasting labs.     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 6/27/2017 titer indicated immune response. MIIC shows patient started HPV series in 2014. She is uncertain where, potentially done at [facility] - she will check with them. Last TDAP in 2007.     Concerns about elevated cholesterol  Recent Labs   Lab Test  01/11/17   1034  07/30/14   0907   CHOL  226*  239*   HDL  56  75   LDL  150*  150*   TRIG  98  70   CHOLHDLRATIO   --   3.2     Family history of high cholesterol in her father.       Amount of exercise or physical activity: 2-3 days/week for an average of 15-30 minutes    Problems taking medications regularly: No    Medication side effects: none    Diet: low fat/cholesterol and gluten-free/reduced      Social history: Patient has one year left of college. She plans to attend veterinary school.     Problem list and histories reviewed & adjusted, as indicated.  Additional history: as documented    Labs reviewed in EPIC    Reviewed and updated as needed this visit by clinical staffTobacco  Allergies  Meds  Med Hx  Surg Hx  Fam Hx  Soc Hx      Reviewed and updated as needed this visit by Provider         ROS:  CONSTITUTIONAL:NEGATIVE for fever, chills, change in weight  ENT/MOUTH: NEGATIVE for ear, mouth and throat problems  RESP:NEGATIVE for significant cough or SOB  CV: NEGATIVE for chest pain, palpitations or peripheral edema  GI: NEGATIVE for nausea, abdominal pain, heartburn, or change in bowel habits  : normal menstrual cycles - taking Blisovi Fe, pap smear up to date, follows with gynecology   NEURO: NEGATIVE for weakness, dizziness or paresthesias  ENDOCRINE: Hx of elevated cholesterol NEGATIVE for temperature intolerance, skin/hair changes  HEME/ALLERGY/IMMUNE: Immune to Rabies NEGATIVE for bleeding  problems  PSYCHIATRIC: NEGATIVE for changes in mood or affect    This document serves as a record of the services and decisions personally performed and made by Kailyn Flood MD. It was created on her behalf by Jessica Berman, a trained medical scribe. The creation of this document is based on the provider's statements to the medical scribe.  Jessica Berman 10:03 AM August 28, 2017    OBJECTIVE:     /60  Pulse 88  Temp 98.2  F (36.8  C) (Oral)  Resp 16  Wt 65.4 kg (144 lb 3.2 oz)  LMP 08/08/2017 (Approximate)  SpO2 99%  BMI 24 kg/m2  Body mass index is 24 kg/(m^2).  GENERAL APPEARANCE: healthy, alert and no distress  HENT: ear canals and TM's normal and nose and mouth without ulcers or lesions  NECK: no adenopathy, no asymmetry, masses, or scars and thyroid normal to palpation  RESP: lungs clear to auscultation - no rales, rhonchi or wheezes  CV: regular rates and rhythm, normal S1 S2, no S3 or S4 and no murmur, click or rub  ABDOMEN: soft, nontender, without hepatosplenomegaly or masses and bowel sounds normal  MS: extremities normal- no gross deformities noted  NEURO: Normal strength and tone, mentation intact and speech normal  PSYCH: mentation appears normal and affect normal/bright    Diagnostic Test Results:  No results found for this or any previous visit (from the past 24 hour(s)).    ASSESSMENT/PLAN:   (E78.5) Elevated fasting lipid profile  (primary encounter diagnosis)  Comment: Reviewed past lipid panels with patient. Based on age, goal to keep LDL<160; will reassess labs today. Encouraged she focus on healthy diet and exercise to decrease LDL, cholesterol, and to increase HDL. Will update labs today and notify patient of results.  Plan: Lipid panel reflex to direct LDL    (Z23) Need for Tdap vaccination  Comment: Updating Tdap vaccination today. Patient will [facility] vacc[facility].   Plan:  TDAP VACCINE (ADACEL)    FUTURE APPOINTMENTS:       - Make appointment  with nurse to update HPV vaccination; she plans to check with pediatrician office; on ly one immunization is noted in MIIC database; if she has only received 1 then due for 2 more immunizations over the next 6 wakes.    The information in this document, created by the medical scribe for me, accurately reflects the services I personally performed and the decisions made by me. I have reviewed and approved this document for accuracy prior to leaving the patient care area.  August 28, 2017 10:03 AM    Kailyn Flood MD  Internal Medicine   [facility]

## 2017-08-28 NOTE — PATIENT INSTRUCTIONS
"Check with [facility] whether the HPV vaccine was administered at their clinics. Then you can schedule a \"Nurse-only\" visit for updating this vaccination.   "

## 2017-08-28 NOTE — NURSING NOTE
"Chief Complaint   Patient presents with     Consult     fasting      pt would like fasting labs and update on her immunizations        Initial /60  Pulse 88  Temp 98.2  F (36.8  C) (Oral)  Resp 16  Wt 144 lb 3.2 oz (65.4 kg)  LMP 08/08/2017 (Approximate)  SpO2 99%  BMI 24 kg/m2 Estimated body mass index is 24 kg/(m^2) as calculated from the following:    Height as of 3/22/17: 5' 5\" (1.651 m).    Weight as of this encounter: 144 lb 3.2 oz (65.4 kg).  Medication Reconciliation: complete    "

## 2017-08-28 NOTE — NURSING NOTE
Screening Questionnaire for Adult Immunization    Are you sick today?   No   Do you have allergies to medications, food, a vaccine component or latex?   No   Have you ever had a serious reaction after receiving a vaccination?   No   Do you have a long-term health problem with heart disease, lung disease, asthma, kidney disease, metabolic disease (e.g. diabetes), anemia, or other blood disorder?   No   Do you have cancer, leukemia, HIV/AIDS, or any other immune system problem?   No   In the past 3 months, have you taken medications that affect  your immune system, such as prednisone, other steroids, or anticancer drugs; drugs for the treatment of rheumatoid arthritis, Crohn s disease, or psoriasis; or have you had radiation treatments?   No   Have you had a seizure, or a brain or other nervous system problem?   No   During the past year, have you received a transfusion of blood or blood     products, or been given immune (gamma) globulin or antiviral drug?   No   For women: Are you pregnant or is there a chance you could become        pregnant during the next month?   No   Have you received any vaccinations in the past 4 weeks?   No     Immunization questionnaire answers were all negative.        Per orders of Dr. Flood, injection of Tdap given by Claudia Collado. Patient instructed to remain in clinic for 15 minutes afterwards, and to report any adverse reaction to me immediately.       Screening performed by Claudia Collado on 8/28/2017 at 10:49 AM.

## 2018-01-03 ENCOUNTER — TRANSFERRED RECORDS (OUTPATIENT)
Dept: HEALTH INFORMATION MANAGEMENT | Facility: CLINIC | Age: 23
End: 2018-01-03

## 2018-01-05 ENCOUNTER — TRANSFERRED RECORDS (OUTPATIENT)
Dept: HEALTH INFORMATION MANAGEMENT | Facility: CLINIC | Age: 23
End: 2018-01-05

## 2018-02-18 ENCOUNTER — HEALTH MAINTENANCE LETTER (OUTPATIENT)
Age: 23
End: 2018-02-18

## 2018-03-10 ENCOUNTER — HEALTH MAINTENANCE LETTER (OUTPATIENT)
Age: 23
End: 2018-03-10

## 2018-10-09 DIAGNOSIS — R09.82 POST-NASAL DRAINAGE: ICD-10-CM

## 2018-10-09 RX ORDER — FLUTICASONE PROPIONATE 50 MCG
SPRAY, SUSPENSION (ML) NASAL
Qty: 16 ML | Refills: 0 | Status: SHIPPED | OUTPATIENT
Start: 2018-10-09

## 2018-10-09 NOTE — TELEPHONE ENCOUNTER
"Requested Prescriptions   Pending Prescriptions Disp Refills     fluticasone (FLONASE) 50 MCG/ACT spray [Pharmacy Med Name: FLUTICASONE PROP 50 MCG SPRAY]  Last Written Prescription Date:  1/11/17  Last Fill Quantity: 1,  # refills: 1   Last office visit: 8/28/2017 with prescribing provider:  Kailyn Flood MD    Future Office Visit:     16 mL 0     Sig: SPRAY 1-2 SPRAYS INTO BOTH NOSTRILS DAILY    Inhaled Steroids Protocol Failed    10/9/2018 11:13 AM       Failed - Recent (12 mo) or future (30 days) visit within the authorizing provider's specialty    Patient had office visit in the last 12 months or has a visit in the next 30 days with authorizing provider or within the authorizing provider's specialty.  See \"Patient Info\" tab in inbasket, or \"Choose Columns\" in Meds & Orders section of the refill encounter.           Passed - Patient is age 12 or older          "

## 2019-10-28 ENCOUNTER — TELEPHONE (OUTPATIENT)
Dept: FAMILY MEDICINE | Facility: CLINIC | Age: 24
End: 2019-10-28

## 2019-10-28 NOTE — TELEPHONE ENCOUNTER
Spoke with nurse and patient patient has no concerns at this time just thought she would get a physical while she is in town from school, I spoke with SB and theres no need for labs at this time. I offered patient a future appointment she declined.

## 2019-11-06 ENCOUNTER — HEALTH MAINTENANCE LETTER (OUTPATIENT)
Age: 24
End: 2019-11-06

## 2020-08-06 ENCOUNTER — OFFICE VISIT (OUTPATIENT)
Dept: OBGYN | Age: 25
End: 2020-08-06

## 2020-08-06 VITALS
HEIGHT: 65 IN | WEIGHT: 144 LBS | BODY MASS INDEX: 23.99 KG/M2 | SYSTOLIC BLOOD PRESSURE: 116 MMHG | DIASTOLIC BLOOD PRESSURE: 68 MMHG

## 2020-08-06 DIAGNOSIS — Z87.42 HX OF ABNORMAL CERVICAL PAP SMEAR: ICD-10-CM

## 2020-08-06 DIAGNOSIS — Z01.419 WELL WOMAN EXAM WITH ROUTINE GYNECOLOGICAL EXAM: Primary | ICD-10-CM

## 2020-08-06 PROCEDURE — 88175 CYTOPATH C/V AUTO FLUID REDO: CPT | Performed by: PATHOLOGY

## 2020-08-06 PROCEDURE — 99385 PREV VISIT NEW AGE 18-39: CPT | Performed by: NURSE PRACTITIONER

## 2020-08-06 RX ORDER — NORETHINDRONE ACETATE AND ETHINYL ESTRADIOL 1MG-20(21)
KIT ORAL
COMMUNITY
Start: 2020-07-02 | End: 2021-12-09 | Stop reason: ALTCHOICE

## 2020-08-06 RX ORDER — DESOGESTREL AND ETHINYL ESTRADIOL 21-5 (28)
1 KIT ORAL DAILY
Qty: 84 TABLET | Refills: 4 | Status: SHIPPED | OUTPATIENT
Start: 2020-08-06 | End: 2021-09-08

## 2020-08-13 LAB
CASE RPRT: NORMAL
CYTOLOGY CVX/VAG STUDY: NORMAL
PAP EDUCATIONAL NOTE: NORMAL
PATH REPORT.ADDENDUM SPEC: NORMAL
SPECIMEN ADEQUACY: NORMAL

## 2020-10-18 ENCOUNTER — E-ADVICE (OUTPATIENT)
Dept: OBGYN | Age: 25
End: 2020-10-18

## 2020-10-21 ENCOUNTER — E-ADVICE (OUTPATIENT)
Dept: OBGYN | Age: 25
End: 2020-10-21

## 2020-10-22 ENCOUNTER — E-ADVICE (OUTPATIENT)
Dept: OBGYN | Age: 25
End: 2020-10-22

## 2020-11-29 ENCOUNTER — HEALTH MAINTENANCE LETTER (OUTPATIENT)
Age: 25
End: 2020-11-29

## 2020-11-30 ENCOUNTER — TRANSFERRED RECORDS (OUTPATIENT)
Dept: HEALTH INFORMATION MANAGEMENT | Facility: CLINIC | Age: 25
End: 2020-11-30

## 2020-12-10 ENCOUNTER — APPOINTMENT (OUTPATIENT)
Dept: URBAN - METROPOLITAN AREA CLINIC 253 | Age: 25
Setting detail: DERMATOLOGY
End: 2020-12-10

## 2020-12-10 VITALS — HEIGHT: 65 IN | RESPIRATION RATE: 16 BRPM | WEIGHT: 142 LBS

## 2020-12-10 DIAGNOSIS — L70.0 ACNE VULGARIS: ICD-10-CM

## 2020-12-10 DIAGNOSIS — B07.8 OTHER VIRAL WARTS: ICD-10-CM

## 2020-12-10 DIAGNOSIS — L259 CONTACT DERMATITIS AND OTHER ECZEMA, UNSPECIFIED CAUSE: ICD-10-CM

## 2020-12-10 PROBLEM — L23.9 ALLERGIC CONTACT DERMATITIS, UNSPECIFIED CAUSE: Status: ACTIVE | Noted: 2020-12-10

## 2020-12-10 PROCEDURE — OTHER COUNSELING: OTHER

## 2020-12-10 PROCEDURE — 17110 DESTRUCT B9 LESION 1-14: CPT

## 2020-12-10 PROCEDURE — 99203 OFFICE O/P NEW LOW 30 MIN: CPT | Mod: 25

## 2020-12-10 PROCEDURE — OTHER PRESCRIPTION: OTHER

## 2020-12-10 PROCEDURE — OTHER BENIGN DESTRUCTION: OTHER

## 2020-12-10 PROCEDURE — OTHER ADDITIONAL NOTES: OTHER

## 2020-12-10 RX ORDER — HYDROCORTISONE 25 MG/G
CREAM TOPICAL BID
Qty: 1 | Refills: 0 | Status: ERX | COMMUNITY
Start: 2020-12-10

## 2020-12-10 RX ORDER — TRETIONIN 0.25 MG/G
CREAM TOPICAL QHS
Qty: 1 | Refills: 3 | Status: ERX | COMMUNITY
Start: 2020-12-10

## 2020-12-10 ASSESSMENT — LOCATION SIMPLE DESCRIPTION DERM
LOCATION SIMPLE: LEFT LIP
LOCATION SIMPLE: LEFT PLANTAR SURFACE
LOCATION SIMPLE: LEFT CHEEK

## 2020-12-10 ASSESSMENT — LOCATION ZONE DERM
LOCATION ZONE: FACE
LOCATION ZONE: FEET
LOCATION ZONE: LIP

## 2020-12-10 ASSESSMENT — LOCATION DETAILED DESCRIPTION DERM
LOCATION DETAILED: LEFT PLANTAR FOREFOOT OVERLYING 3RD METATARSAL
LOCATION DETAILED: LEFT MEDIAL MALAR CHEEK
LOCATION DETAILED: LEFT NASOLABIAL FOLD
LOCATION DETAILED: LEFT PLANTAR FOREFOOT OVERLYING 4TH METATARSAL

## 2020-12-10 NOTE — HPI: FULL BODY SKIN EXAMINATION
How Severe Are Your Spot(S)?: mild
What Type Of Note Output Would You Prefer (Optional)?: Standard Output
What Is The Reason For Today's Visit?: Skin Lesions
What Is The Reason For Today's Visit? (Being Monitored For X): the development of new lesions
Additional History: She has a rash around her mouth and on both eyelids for two months. She has hx of gluten sensitivity and corn. She also has a wart on her foot- she has had it frozen 4 times on left foot. It is growing.

## 2020-12-10 NOTE — PROCEDURE: BENIGN DESTRUCTION
Include Z78.9 (Other Specified Conditions Influencing Health Status) As An Associated Diagnosis?: No
Detail Level: Simple
Consent: The patient's consent was obtained including but not limited to risks of crusting, scabbing, blistering, scarring, darker or lighter pigmentary change, recurrence, incomplete removal and infection.
Treatment Number (Will Not Render If 0): 0
Post-Care Instructions: I reviewed with the patient in detail post-care instructions. Patient is to wear sunprotection, and avoid picking at any of the treated lesions. Pt may apply Vaseline to crusted or scabbing areas.
Medical Necessity Information: It is in your best interest to select a reason for this procedure from the list below. All of these items fulfill various CMS LCD requirements except the new and changing color options.
Medical Necessity Clause: This procedure was medically necessary because the lesions that were treated were:
Anesthesia Volume In Cc: 0.5

## 2020-12-10 NOTE — PROCEDURE: COUNSELING
Dapsone Pregnancy And Lactation Text: This medication is Pregnancy Category C and is not considered safe during pregnancy or breast feeding.
Bactrim Pregnancy And Lactation Text: This medication is Pregnancy Category D and is known to cause fetal risk.  It is also excreted in breast milk.
Doxycycline Pregnancy And Lactation Text: This medication is Pregnancy Category D and not consider safe during pregnancy. It is also excreted in breast milk but is considered safe for shorter treatment courses.
Azithromycin Pregnancy And Lactation Text: This medication is considered safe during pregnancy and is also secreted in breast milk.
Isotretinoin Pregnancy And Lactation Text: This medication is Pregnancy Category X and is considered extremely dangerous during pregnancy. It is unknown if it is excreted in breast milk.
Detail Level: Zone
Sarecycline Pregnancy And Lactation Text: This medication is Pregnancy Category D and not consider safe during pregnancy. It is also excreted in breast milk.
Benzoyl Peroxide Pregnancy And Lactation Text: This medication is Pregnancy Category C. It is unknown if benzoyl peroxide is excreted in breast milk.
Topical Sulfur Applications Pregnancy And Lactation Text: This medication is Pregnancy Category C and has an unknown safety profile during pregnancy. It is unknown if this topical medication is excreted in breast milk.
Use Enhanced Medication Counseling?: No
Topical Retinoid Pregnancy And Lactation Text: This medication is Pregnancy Category C. It is unknown if this medication is excreted in breast milk.
Erythromycin Pregnancy And Lactation Text: This medication is Pregnancy Category B and is considered safe during pregnancy. It is also excreted in breast milk.
Birth Control Pills Counseling: Birth Control Pill Counseling: I discussed with the patient the potential side effects of OCPs including but not limited to increased risk of stroke, heart attack, thrombophlebitis, deep venous thrombosis, hepatic adenomas, breast changes, GI upset, headaches, and depression.  The patient verbalized understanding of the proper use and possible adverse effects of OCPs. All of the patient's questions and concerns were addressed.
Minocycline Counseling: Patient advised regarding possible photosensitivity and discoloration of the teeth, skin, lips, tongue and gums.  Patient instructed to avoid sunlight, if possible.  When exposed to sunlight, patients should wear protective clothing, sunglasses, and sunscreen.  The patient was instructed to call the office immediately if the following severe adverse effects occur:  hearing changes, easy bruising/bleeding, severe headache, or vision changes.  The patient verbalized understanding of the proper use and possible adverse effects of minocycline.  All of the patient's questions and concerns were addressed.
High Dose Vitamin A Counseling: Side effects reviewed, pt to contact office should one occur.
Topical Clindamycin Pregnancy And Lactation Text: This medication is Pregnancy Category B and is considered safe during pregnancy. It is unknown if it is excreted in breast milk.
Sarecycline Counseling: Patient advised regarding possible photosensitivity and discoloration of the teeth, skin, lips, tongue and gums.  Patient instructed to avoid sunlight, if possible.  When exposed to sunlight, patients should wear protective clothing, sunglasses, and sunscreen.  The patient was instructed to call the office immediately if the following severe adverse effects occur:  hearing changes, easy bruising/bleeding, severe headache, or vision changes.  The patient verbalized understanding of the proper use and possible adverse effects of sarecycline.  All of the patient's questions and concerns were addressed.
Erythromycin Counseling:  I discussed with the patient the risks of erythromycin including but not limited to GI upset, allergic reaction, drug rash, diarrhea, increase in liver enzymes, and yeast infections.
Spironolactone Pregnancy And Lactation Text: This medication can cause feminization of the male fetus and should be avoided during pregnancy. The active metabolite is also found in breast milk.
High Dose Vitamin A Pregnancy And Lactation Text: High dose vitamin A therapy is contraindicated during pregnancy and breast feeding.
Bactrim Counseling:  I discussed with the patient the risks of sulfa antibiotics including but not limited to GI upset, allergic reaction, drug rash, diarrhea, dizziness, photosensitivity, and yeast infections.  Rarely, more serious reactions can occur including but not limited to aplastic anemia, agranulocytosis, methemoglobinemia, blood dyscrasias, liver or kidney failure, lung infiltrates or desquamative/blistering drug rashes.
Isotretinoin Counseling: Patient should get monthly blood tests, not donate blood, not drive at night if vision affected, not share medication, and not undergo elective surgery for 6 months after tx completed. Side effects reviewed, pt to contact office should one occur.
Spironolactone Counseling: Patient advised regarding risks of diarrhea, abdominal pain, hyperkalemia, birth defects (for female patients), liver toxicity and renal toxicity. The patient may need blood work to monitor liver and kidney function and potassium levels while on therapy. The patient verbalized understanding of the proper use and possible adverse effects of spironolactone.  All of the patient's questions and concerns were addressed.
Tetracycline Counseling: Patient counseled regarding possible photosensitivity and increased risk for sunburn.  Patient instructed to avoid sunlight, if possible.  When exposed to sunlight, patients should wear protective clothing, sunglasses, and sunscreen.  The patient was instructed to call the office immediately if the following severe adverse effects occur:  hearing changes, easy bruising/bleeding, severe headache, or vision changes.  The patient verbalized understanding of the proper use and possible adverse effects of tetracycline.  All of the patient's questions and concerns were addressed. Patient understands to avoid pregnancy while on therapy due to potential birth defects.
Tazorac Counseling:  Patient advised that medication is irritating and drying.  Patient may need to apply sparingly and wash off after an hour before eventually leaving it on overnight.  The patient verbalized understanding of the proper use and possible adverse effects of tazorac.  All of the patient's questions and concerns were addressed.
Doxycycline Counseling:  Patient counseled regarding possible photosensitivity and increased risk for sunburn.  Patient instructed to avoid sunlight, if possible.  When exposed to sunlight, patients should wear protective clothing, sunglasses, and sunscreen.  The patient was instructed to call the office immediately if the following severe adverse effects occur:  hearing changes, easy bruising/bleeding, severe headache, or vision changes.  The patient verbalized understanding of the proper use and possible adverse effects of doxycycline.  All of the patient's questions and concerns were addressed.
Topical Clindamycin Counseling: Patient counseled that this medication may cause skin irritation or allergic reactions.  In the event of skin irritation, the patient was advised to reduce the amount of the drug applied or use it less frequently.   The patient verbalized understanding of the proper use and possible adverse effects of clindamycin.  All of the patient's questions and concerns were addressed.
Topical Sulfur Applications Counseling: Topical Sulfur Counseling: Patient counseled that this medication may cause skin irritation or allergic reactions.  In the event of skin irritation, the patient was advised to reduce the amount of the drug applied or use it less frequently.   The patient verbalized understanding of the proper use and possible adverse effects of topical sulfur application.  All of the patient's questions and concerns were addressed.
Azithromycin Counseling:  I discussed with the patient the risks of azithromycin including but not limited to GI upset, allergic reaction, drug rash, diarrhea, and yeast infections.
Dapsone Counseling: I discussed with the patient the risks of dapsone including but not limited to hemolytic anemia, agranulocytosis, rashes, methemoglobinemia, kidney failure, peripheral neuropathy, headaches, GI upset, and liver toxicity.  Patients who start dapsone require monitoring including baseline LFTs and weekly CBCs for the first month, then every month thereafter.  The patient verbalized understanding of the proper use and possible adverse effects of dapsone.  All of the patient's questions and concerns were addressed.
Benzoyl Peroxide Counseling: Patient counseled that medicine may cause skin irritation and bleach clothing.  In the event of skin irritation, the patient was advised to reduce the amount of the drug applied or use it less frequently.   The patient verbalized understanding of the proper use and possible adverse effects of benzoyl peroxide.  All of the patient's questions and concerns were addressed.
Topical Retinoid counseling:  Patient advised to apply a pea-sized amount only at bedtime and wait 30 minutes after washing their face before applying.  If too drying, patient may add a non-comedogenic moisturizer. The patient verbalized understanding of the proper use and possible adverse effects of retinoids.  All of the patient's questions and concerns were addressed.
Birth Control Pills Pregnancy And Lactation Text: This medication should be avoided if pregnant and for the first 30 days post-partum.
Tazorac Pregnancy And Lactation Text: This medication is not safe during pregnancy. It is unknown if this medication is excreted in breast milk.

## 2020-12-28 ENCOUNTER — TRANSFERRED RECORDS (OUTPATIENT)
Dept: HEALTH INFORMATION MANAGEMENT | Facility: CLINIC | Age: 25
End: 2020-12-28

## 2021-03-17 ENCOUNTER — APPOINTMENT (OUTPATIENT)
Dept: URBAN - METROPOLITAN AREA CLINIC 256 | Age: 26
Setting detail: DERMATOLOGY
End: 2021-03-17

## 2021-05-11 ENCOUNTER — APPOINTMENT (OUTPATIENT)
Dept: URBAN - METROPOLITAN AREA CLINIC 256 | Age: 26
Setting detail: DERMATOLOGY
End: 2021-05-11

## 2021-09-08 RX ORDER — DESOGESTREL AND ETHINYL ESTRADIOL 21-5 (28)
KIT ORAL
Qty: 84 TABLET | Refills: 0 | Status: SHIPPED | OUTPATIENT
Start: 2021-09-08 | End: 2021-12-01

## 2021-09-19 ENCOUNTER — HEALTH MAINTENANCE LETTER (OUTPATIENT)
Age: 26
End: 2021-09-19

## 2021-09-27 ENCOUNTER — APPOINTMENT (OUTPATIENT)
Dept: OBGYN | Age: 26
End: 2021-09-27

## 2021-12-01 RX ORDER — DESOGESTREL AND ETHINYL ESTRADIOL 21-5 (28)
KIT ORAL
Qty: 28 TABLET | Refills: 0 | Status: SHIPPED | OUTPATIENT
Start: 2021-12-01 | End: 2021-12-09 | Stop reason: SDUPTHER

## 2021-12-09 ENCOUNTER — OFFICE VISIT (OUTPATIENT)
Dept: OBGYN | Age: 26
End: 2021-12-09

## 2021-12-09 VITALS
WEIGHT: 150 LBS | DIASTOLIC BLOOD PRESSURE: 70 MMHG | HEART RATE: 68 BPM | HEIGHT: 65 IN | BODY MASS INDEX: 24.99 KG/M2 | SYSTOLIC BLOOD PRESSURE: 108 MMHG

## 2021-12-09 DIAGNOSIS — Z01.419 WELL WOMAN EXAM WITH ROUTINE GYNECOLOGICAL EXAM: Primary | ICD-10-CM

## 2021-12-09 DIAGNOSIS — Z11.3 SCREEN FOR STD (SEXUALLY TRANSMITTED DISEASE): ICD-10-CM

## 2021-12-09 PROCEDURE — 87491 CHLMYD TRACH DNA AMP PROBE: CPT | Performed by: INTERNAL MEDICINE

## 2021-12-09 PROCEDURE — 99395 PREV VISIT EST AGE 18-39: CPT | Performed by: NURSE PRACTITIONER

## 2021-12-09 PROCEDURE — 87591 N.GONORRHOEAE DNA AMP PROB: CPT | Performed by: INTERNAL MEDICINE

## 2021-12-09 PROCEDURE — 87661 TRICHOMONAS VAGINALIS AMPLIF: CPT | Performed by: INTERNAL MEDICINE

## 2021-12-09 RX ORDER — COVID-19 MOLECULAR TEST ASSAY
KIT MISCELLANEOUS
COMMUNITY
Start: 2021-11-10 | End: 2021-12-09 | Stop reason: CLARIF

## 2021-12-09 RX ORDER — DESOGESTREL AND ETHINYL ESTRADIOL 21-5 (28)
1 KIT ORAL DAILY
Qty: 84 TABLET | Refills: 4 | Status: SHIPPED | OUTPATIENT
Start: 2021-12-09 | End: 2022-12-09

## 2021-12-09 ASSESSMENT — PATIENT HEALTH QUESTIONNAIRE - PHQ9
1. LITTLE INTEREST OR PLEASURE IN DOING THINGS: NOT AT ALL
2. FEELING DOWN, DEPRESSED OR HOPELESS: NOT AT ALL
CLINICAL INTERPRETATION OF PHQ2 SCORE: NO FURTHER SCREENING NEEDED
SUM OF ALL RESPONSES TO PHQ9 QUESTIONS 1 AND 2: 0
SUM OF ALL RESPONSES TO PHQ9 QUESTIONS 1 AND 2: 0

## 2021-12-10 LAB
C TRACH RRNA CVX QL NAA+PROBE: NEGATIVE
Lab: NORMAL
N GONORRHOEA RRNA CVX QL NAA+PROBE: NEGATIVE

## 2021-12-13 LAB — T VAGINALIS RRNA CVX QL NAA+PROBE: NEGATIVE

## 2022-01-09 ENCOUNTER — HEALTH MAINTENANCE LETTER (OUTPATIENT)
Age: 27
End: 2022-01-09

## 2022-11-21 ENCOUNTER — HEALTH MAINTENANCE LETTER (OUTPATIENT)
Age: 27
End: 2022-11-21

## 2023-04-16 ENCOUNTER — HEALTH MAINTENANCE LETTER (OUTPATIENT)
Age: 28
End: 2023-04-16